# Patient Record
Sex: MALE | Race: WHITE | Employment: OTHER | URBAN - METROPOLITAN AREA
[De-identification: names, ages, dates, MRNs, and addresses within clinical notes are randomized per-mention and may not be internally consistent; named-entity substitution may affect disease eponyms.]

---

## 2017-10-05 PROBLEM — E78.00 PURE HYPERCHOLESTEROLEMIA: Status: ACTIVE | Noted: 2017-10-05

## 2017-10-05 PROCEDURE — 81003 URINALYSIS AUTO W/O SCOPE: CPT | Performed by: FAMILY MEDICINE

## 2018-07-26 PROCEDURE — 81003 URINALYSIS AUTO W/O SCOPE: CPT | Performed by: FAMILY MEDICINE

## 2019-03-21 ENCOUNTER — HOSPITAL ENCOUNTER (INPATIENT)
Facility: HOSPITAL | Age: 84
LOS: 1 days | Discharge: HOME | DRG: 446 | End: 2019-03-22
Attending: EMERGENCY MEDICINE | Admitting: HOSPITALIST
Payer: MEDICARE

## 2019-03-21 ENCOUNTER — APPOINTMENT (OUTPATIENT)
Dept: CT IMAGING | Facility: HOSPITAL | Age: 84
DRG: 446 | End: 2019-03-21
Attending: EMERGENCY MEDICINE
Payer: MEDICARE

## 2019-03-21 DIAGNOSIS — K83.8 DILATED CBD, ACQUIRED: ICD-10-CM

## 2019-03-21 DIAGNOSIS — R17 JAUNDICE: Primary | ICD-10-CM

## 2019-03-21 DIAGNOSIS — R79.89 ELEVATED LFTS: ICD-10-CM

## 2019-03-21 LAB
ALBUMIN SERPL-MCNC: 3.5 G/DL (ref 3.4–5)
ALBUMIN/GLOB SERPL: 0.9 {RATIO} (ref 1–2)
ALP LIVER SERPL-CCNC: 167 U/L (ref 45–117)
ALT SERPL-CCNC: 363 U/L (ref 16–61)
AMMONIA PLAS-MCNC: 28 UMOL/L (ref 11–32)
ANION GAP SERPL CALC-SCNC: 9 MMOL/L (ref 0–18)
AST SERPL-CCNC: 186 U/L (ref 15–37)
BASOPHILS # BLD AUTO: 0.03 X10(3) UL (ref 0–0.2)
BASOPHILS NFR BLD AUTO: 0.4 %
BILIRUB SERPL-MCNC: 8.5 MG/DL (ref 0.1–2)
BILIRUB UR QL STRIP.AUTO: NEGATIVE
BUN BLD-MCNC: 8 MG/DL (ref 7–18)
BUN/CREAT SERPL: 8.6 (ref 10–20)
C DIFF TOX B STL QL: NEGATIVE
CALCIUM BLD-MCNC: 9.3 MG/DL (ref 8.5–10.1)
CANCER AG19-9 SERPL-ACNC: 261.6 U/ML (ref ?–37)
CHLORIDE SERPL-SCNC: 105 MMOL/L (ref 98–107)
CLARITY UR REFRACT.AUTO: CLEAR
CO2 SERPL-SCNC: 21 MMOL/L (ref 21–32)
COLOR UR AUTO: YELLOW
CREAT BLD-MCNC: 0.93 MG/DL (ref 0.7–1.3)
DEPRECATED RDW RBC AUTO: 43 FL (ref 35.1–46.3)
EOSINOPHIL # BLD AUTO: 0.08 X10(3) UL (ref 0–0.7)
EOSINOPHIL NFR BLD AUTO: 1.2 %
ERYTHROCYTE [DISTWIDTH] IN BLOOD BY AUTOMATED COUNT: 13.3 % (ref 11–15)
GLOBULIN PLAS-MCNC: 4 G/DL (ref 2.8–4.4)
GLUCOSE BLD-MCNC: 130 MG/DL (ref 70–99)
GLUCOSE UR STRIP.AUTO-MCNC: NEGATIVE MG/DL
HAV IGM SER QL: NONREACTIVE
HBV CORE IGM SER QL: NONREACTIVE
HBV SURFACE AG SERPL QL IA: NONREACTIVE
HCT VFR BLD AUTO: 44.1 % (ref 39–53)
HCV AB SERPL QL IA: NONREACTIVE
HGB BLD-MCNC: 16.1 G/DL (ref 13–17.5)
IMM GRANULOCYTES # BLD AUTO: 0.03 X10(3) UL (ref 0–1)
IMM GRANULOCYTES NFR BLD: 0.4 %
INR BLD: 0.96 (ref 0.9–1.1)
KETONES UR STRIP.AUTO-MCNC: NEGATIVE MG/DL
LEUKOCYTE ESTERASE UR QL STRIP.AUTO: NEGATIVE
LIPASE SERPL-CCNC: 116 U/L (ref 73–393)
LYMPHOCYTES # BLD AUTO: 0.67 X10(3) UL (ref 1–4)
LYMPHOCYTES NFR BLD AUTO: 9.8 %
M PROTEIN MFR SERPL ELPH: 7.5 G/DL (ref 6.4–8.2)
MCH RBC QN AUTO: 32.5 PG (ref 26–34)
MCHC RBC AUTO-ENTMCNC: 36.5 G/DL (ref 31–37)
MCV RBC AUTO: 88.9 FL (ref 80–100)
MONOCYTES # BLD AUTO: 0.55 X10(3) UL (ref 0.1–1)
MONOCYTES NFR BLD AUTO: 8.1 %
NEUTROPHILS # BLD AUTO: 5.47 X10 (3) UL (ref 1.5–7.7)
NEUTROPHILS # BLD AUTO: 5.47 X10(3) UL (ref 1.5–7.7)
NEUTROPHILS NFR BLD AUTO: 80.1 %
NITRITE UR QL STRIP.AUTO: NEGATIVE
OSMOLALITY SERPL CALC.SUM OF ELEC: 280 MOSM/KG (ref 275–295)
PH UR STRIP.AUTO: 7 [PH] (ref 4.5–8)
PLATELET # BLD AUTO: 194 10(3)UL (ref 150–450)
POTASSIUM SERPL-SCNC: 3.7 MMOL/L (ref 3.5–5.1)
PROT UR STRIP.AUTO-MCNC: NEGATIVE MG/DL
PSA SERPL DL<=0.01 NG/ML-MCNC: 13.2 SECONDS (ref 12.5–14.7)
RBC # BLD AUTO: 4.96 X10(6)UL (ref 3.8–5.8)
RBC UR QL AUTO: NEGATIVE
SODIUM SERPL-SCNC: 135 MMOL/L (ref 136–145)
SP GR UR STRIP.AUTO: <1.005 (ref 1–1.03)
UROBILINOGEN UR STRIP.AUTO-MCNC: <2 MG/DL
WBC # BLD AUTO: 6.8 X10(3) UL (ref 4–11)

## 2019-03-21 PROCEDURE — 87045 FECES CULTURE AEROBIC BACT: CPT | Performed by: NURSE PRACTITIONER

## 2019-03-21 PROCEDURE — 74177 CT ABD & PELVIS W/CONTRAST: CPT | Performed by: EMERGENCY MEDICINE

## 2019-03-21 PROCEDURE — 85610 PROTHROMBIN TIME: CPT | Performed by: NURSE PRACTITIONER

## 2019-03-21 PROCEDURE — 85025 COMPLETE CBC W/AUTO DIFF WBC: CPT | Performed by: EMERGENCY MEDICINE

## 2019-03-21 PROCEDURE — 99285 EMERGENCY DEPT VISIT HI MDM: CPT | Performed by: EMERGENCY MEDICINE

## 2019-03-21 PROCEDURE — 86301 IMMUNOASSAY TUMOR CA 19-9: CPT | Performed by: NURSE PRACTITIONER

## 2019-03-21 PROCEDURE — 87427 SHIGA-LIKE TOXIN AG IA: CPT | Performed by: NURSE PRACTITIONER

## 2019-03-21 PROCEDURE — 87493 C DIFF AMPLIFIED PROBE: CPT | Performed by: NURSE PRACTITIONER

## 2019-03-21 PROCEDURE — 82140 ASSAY OF AMMONIA: CPT | Performed by: EMERGENCY MEDICINE

## 2019-03-21 PROCEDURE — 81003 URINALYSIS AUTO W/O SCOPE: CPT | Performed by: EMERGENCY MEDICINE

## 2019-03-21 PROCEDURE — 96360 HYDRATION IV INFUSION INIT: CPT | Performed by: EMERGENCY MEDICINE

## 2019-03-21 PROCEDURE — 96361 HYDRATE IV INFUSION ADD-ON: CPT | Performed by: EMERGENCY MEDICINE

## 2019-03-21 PROCEDURE — 80074 ACUTE HEPATITIS PANEL: CPT | Performed by: EMERGENCY MEDICINE

## 2019-03-21 PROCEDURE — 80053 COMPREHEN METABOLIC PANEL: CPT | Performed by: EMERGENCY MEDICINE

## 2019-03-21 PROCEDURE — 83690 ASSAY OF LIPASE: CPT | Performed by: EMERGENCY MEDICINE

## 2019-03-21 PROCEDURE — 87046 STOOL CULTR AEROBIC BACT EA: CPT | Performed by: NURSE PRACTITIONER

## 2019-03-21 RX ORDER — ACETAMINOPHEN 325 MG/1
650 TABLET ORAL EVERY 6 HOURS PRN
Status: DISCONTINUED | OUTPATIENT
Start: 2019-03-21 | End: 2019-03-22

## 2019-03-21 RX ORDER — ASPIRIN 81 MG/1
81 TABLET ORAL DAILY
Status: ON HOLD | COMMUNITY
End: 2019-03-22

## 2019-03-21 RX ORDER — CALCIUM POLYCARBOPHIL 625 MG
2 TABLET ORAL DAILY
COMMUNITY

## 2019-03-21 RX ORDER — METOCLOPRAMIDE HYDROCHLORIDE 5 MG/ML
10 INJECTION INTRAMUSCULAR; INTRAVENOUS EVERY 8 HOURS PRN
Status: DISCONTINUED | OUTPATIENT
Start: 2019-03-21 | End: 2019-03-22

## 2019-03-21 RX ORDER — HEPARIN SODIUM 5000 [USP'U]/ML
5000 INJECTION, SOLUTION INTRAVENOUS; SUBCUTANEOUS EVERY 8 HOURS SCHEDULED
Status: DISCONTINUED | OUTPATIENT
Start: 2019-03-21 | End: 2019-03-22

## 2019-03-21 RX ORDER — ONDANSETRON 2 MG/ML
4 INJECTION INTRAMUSCULAR; INTRAVENOUS EVERY 6 HOURS PRN
Status: DISCONTINUED | OUTPATIENT
Start: 2019-03-21 | End: 2019-03-22

## 2019-03-21 RX ORDER — OMEPRAZOLE 20 MG/1
20 CAPSULE, DELAYED RELEASE ORAL
COMMUNITY
End: 2020-01-13

## 2019-03-21 RX ORDER — POTASSIUM CHLORIDE 20 MEQ/1
40 TABLET, EXTENDED RELEASE ORAL ONCE
Status: COMPLETED | OUTPATIENT
Start: 2019-03-21 | End: 2019-03-21

## 2019-03-21 NOTE — PROGRESS NOTES
Pt arrived from ER, he is alert and oriented, very pleasant . He stated he had a few loose watery stools this am. He is in contact, r/o c-diff, RA belly soft, non-distended. Hyperactive bs. NPO after midnight, for ERCP in AM, consent signed, denies pain.  Phong Thomas

## 2019-03-21 NOTE — H&P
[facility] Hospitalist History and Physical      Patient presents with:  Jaundice (gastrointestinal, hematologic)  Nausea/Vomiting/Diarrhea (gastrointestinal)       PCP: Shanae Aaron MD      History of Present Illness: Patient is a 80year old male with PMH si appreciated   Abdomen:   Soft, non-tender. Bowel sounds normal. No masses,  No organomegaly. Non distended   Extremities: Extremities normal, atraumatic, no cyanosis or edema. Skin: Skin color, texture, turgor normal. No rashes or lesions.     Neurologic: evaluation as clinically directed. There is also moderate porcelain calcification of the gallbladder wall with cholelithiasis also noted. SPLEEN:  Calcified granulomas. Accessory spleen noted. PANCREAS:  Unremarkable. ADRENALS:  Unremarkable.  KIDNEYS:  S Approved by: Alayna Merritt MD               Assessment/Plan:     Dilated intrahepatic bile ducts, porcelain calcifications GB w cholelithiasis  - plan EUS w ERCP tomorrow  - ca 19-9    Diarrhea  - r/o cdif          Outpatient records or previous hospital

## 2019-03-21 NOTE — CONSULTS
[facility]                       Gastroenterology [address] Gastroenterology    Hunter Simpers Patient Status:  Emergency    4/3/1934 MRN [id number]   Location [address] [facility] Attending Philip García MD   [facility] Day # history of colon cancer or other gastrointestinal malignancies; No family history of ulcer disease, or inflammatory bowel disease  ROS:  In addition to the pertinent positives described above:             Infectious Disease:  No chronic infections or recen to percussion in the upper abd  Ext: No peripheral edema or cyanosis  Skin: Warm and dry; generalized jaundice   Psychiatric: Appropriate mood and congruent affect without obvious depression or anxiety  Labs: Lab Results   Component Value Date    WBC 6.8 0 entirely excluded. Clinical correlation   recommended. MRCP/ERCP could be performed for further evaluation as clinically directed. There is also moderate porcelain calcification of the gallbladder wall with cholelithiasis also noted.   SPLEEN:  Calcified enlargement. 6. Left renal cysts. Please see above for further details.        Dictated by: Ashley Reza MD on 3/21/2019 at 12:07       Approved by: Ashley Reza MD     Impression: 80 yr-old male with hx of dyslipidemia and GERD presented to t Gastroenterology  [phone]

## 2019-03-21 NOTE — ED PROVIDER NOTES
Patient Seen in: [facility] Emergency Department    History   Patient presents with:  Jaundice (gastrointestinal, hematologic)  Nausea/Vomiting/Diarrhea (gastrointestinal)    Stated Complaint: n/v/d, jaundice    HPI    80-year-old male presents demetra scleral icterus, mucous membranes are moist, there is no erythema or exudate in the posterior pharynx  Neck: Supple no JVD no lymphadenopathy no meningismus no carotid bruit  CV: Regular rate and rhythm no murmur rub  Respiratory: Clear to auscultation ricki work.    Ct Abdomen+pelvis(contrast Only)(cpt=74177)    Result Date: 3/21/2019  CONCLUSION:   1. There is mild dilatation of the intrahepatic biliary ducts. There is also dilatation of the common bile duct measuring up to 1.2 cm in diameter.   A distal obs

## 2019-03-22 ENCOUNTER — ANESTHESIA EVENT (OUTPATIENT)
Dept: ENDOSCOPY | Facility: HOSPITAL | Age: 84
End: 2019-03-22

## 2019-03-22 ENCOUNTER — ANESTHESIA (OUTPATIENT)
Dept: ENDOSCOPY | Facility: HOSPITAL | Age: 84
End: 2019-03-22

## 2019-03-22 VITALS
WEIGHT: 205 LBS | DIASTOLIC BLOOD PRESSURE: 71 MMHG | TEMPERATURE: 98 F | HEART RATE: 66 BPM | BODY MASS INDEX: 27.17 KG/M2 | HEIGHT: 73 IN | SYSTOLIC BLOOD PRESSURE: 132 MMHG | OXYGEN SATURATION: 95 % | RESPIRATION RATE: 18 BRPM

## 2019-03-22 LAB
ALBUMIN SERPL-MCNC: 3.3 G/DL (ref 3.4–5)
ALBUMIN/GLOB SERPL: 1 {RATIO} (ref 1–2)
ALP LIVER SERPL-CCNC: 160 U/L (ref 45–117)
ALT SERPL-CCNC: 302 U/L (ref 16–61)
ANION GAP SERPL CALC-SCNC: 9 MMOL/L (ref 0–18)
AST SERPL-CCNC: 120 U/L (ref 15–37)
BASOPHILS # BLD AUTO: 0.02 X10(3) UL (ref 0–0.2)
BASOPHILS NFR BLD AUTO: 0.4 %
BILIRUB SERPL-MCNC: 2.8 MG/DL (ref 0.1–2)
BUN BLD-MCNC: 8 MG/DL (ref 7–18)
BUN/CREAT SERPL: 9.5 (ref 10–20)
CALCIUM BLD-MCNC: 8.6 MG/DL (ref 8.5–10.1)
CHLORIDE SERPL-SCNC: 109 MMOL/L (ref 98–107)
CO2 SERPL-SCNC: 23 MMOL/L (ref 21–32)
CREAT BLD-MCNC: 0.84 MG/DL (ref 0.7–1.3)
DEPRECATED RDW RBC AUTO: 44.3 FL (ref 35.1–46.3)
EOSINOPHIL # BLD AUTO: 0.17 X10(3) UL (ref 0–0.7)
EOSINOPHIL NFR BLD AUTO: 3.7 %
ERYTHROCYTE [DISTWIDTH] IN BLOOD BY AUTOMATED COUNT: 13.2 % (ref 11–15)
GLOBULIN PLAS-MCNC: 3.2 G/DL (ref 2.8–4.4)
GLUCOSE BLD-MCNC: 98 MG/DL (ref 70–99)
HAV IGM SER QL: 2 MG/DL (ref 1.6–2.6)
HCT VFR BLD AUTO: 41.1 % (ref 39–53)
HGB BLD-MCNC: 14.6 G/DL (ref 13–17.5)
IMM GRANULOCYTES # BLD AUTO: 0.01 X10(3) UL (ref 0–1)
IMM GRANULOCYTES NFR BLD: 0.2 %
LYMPHOCYTES # BLD AUTO: 0.99 X10(3) UL (ref 1–4)
LYMPHOCYTES NFR BLD AUTO: 21.6 %
M PROTEIN MFR SERPL ELPH: 6.5 G/DL (ref 6.4–8.2)
MCH RBC QN AUTO: 32.2 PG (ref 26–34)
MCHC RBC AUTO-ENTMCNC: 35.5 G/DL (ref 31–37)
MCV RBC AUTO: 90.7 FL (ref 80–100)
MONOCYTES # BLD AUTO: 0.47 X10(3) UL (ref 0.1–1)
MONOCYTES NFR BLD AUTO: 10.3 %
NEUTROPHILS # BLD AUTO: 2.92 X10 (3) UL (ref 1.5–7.7)
NEUTROPHILS # BLD AUTO: 2.92 X10(3) UL (ref 1.5–7.7)
NEUTROPHILS NFR BLD AUTO: 63.8 %
OSMOLALITY SERPL CALC.SUM OF ELEC: 290 MOSM/KG (ref 275–295)
PLATELET # BLD AUTO: 179 10(3)UL (ref 150–450)
POTASSIUM SERPL-SCNC: 3.9 MMOL/L (ref 3.5–5.1)
POTASSIUM SERPL-SCNC: 3.9 MMOL/L (ref 3.5–5.1)
RBC # BLD AUTO: 4.53 X10(6)UL (ref 3.8–5.8)
SODIUM SERPL-SCNC: 141 MMOL/L (ref 136–145)
WBC # BLD AUTO: 4.6 X10(3) UL (ref 4–11)

## 2019-03-22 PROCEDURE — 84132 ASSAY OF SERUM POTASSIUM: CPT | Performed by: INTERNAL MEDICINE

## 2019-03-22 PROCEDURE — 83735 ASSAY OF MAGNESIUM: CPT | Performed by: NURSE PRACTITIONER

## 2019-03-22 PROCEDURE — 85025 COMPLETE CBC W/AUTO DIFF WBC: CPT | Performed by: NURSE PRACTITIONER

## 2019-03-22 PROCEDURE — 0FJB8ZZ INSPECTION OF HEPATOBILIARY DUCT, VIA NATURAL OR ARTIFICIAL OPENING ENDOSCOPIC: ICD-10-PCS | Performed by: INTERNAL MEDICINE

## 2019-03-22 PROCEDURE — BF40ZZZ ULTRASONOGRAPHY OF BILE DUCTS: ICD-10-PCS | Performed by: INTERNAL MEDICINE

## 2019-03-22 PROCEDURE — 80053 COMPREHEN METABOLIC PANEL: CPT | Performed by: NURSE PRACTITIONER

## 2019-03-22 NOTE — OPERATIVE REPORT
Mariangel Gomes Patient Status:  Observation    4/3/1934 MRN [id number]   [facility] ENDOSCOPY Attending Ada De La Fuente MD   Hosp Day # 0 PCP Terry Moser MD     PREOPERATIVE DIAGNOSIS/INDICATION: Obstructive Jaundice  POST IMPRESSION:   1. Normal EUS of the CBD indicated likely passed stone. No ERCP required. 2. Cholelithiasis. RECOMMENDATIONS:    1. Consider cholecystectomy per general surgery.      Tracie Malagon  Gastroenterology/Advanced [address]

## 2019-03-22 NOTE — ANESTHESIA PREPROCEDURE EVALUATION
PRE-OP EVALUATION    Patient Name: Jovita López    Pre-op Diagnosis: Elevated LFTs [R94.5]  Dilated cbd, acquired [K83.8]    Procedure(s):  ENDOSCOPIC ULTRASOUND (EUS), ENDOSCOPIC RETROGRADE CHOLANGIOPANCREATOGRAPHY (ERCP)  ENDOSCOPIC RETROGRADE Lyric Langford Endo/Other                           (+) arthritis       Pulmonary                           Neuro/Psych                                    GERD (gastroesophageal reflux disease) Primary osteoarthritis of right knee   Pure hypercholesterolemia Jaundice   E

## 2019-03-22 NOTE — CM/SW NOTE
03/22/19 1300   CM/SW Screening   Referral Source    Information Source Chart review;Nursing rounds   Patient's Mental Status Alert;Oriented   Patient's [address]   Patient lives with Spouse   Patient Status Prior to Admission

## 2019-03-22 NOTE — PROGRESS NOTES
NURSING DISCHARGE NOTE    Discharged Home via Ambulatory. Accompanied by Family member  Belongings Taken by patient/family. Discharge paperwork given to patient.  Instructed patient to hold aspirin for procedure on Wednesday, he voiced understanding

## 2019-03-22 NOTE — DISCHARGE SUMMARY
General Medicine Discharge Summary     Patient ID:  Chintan Manzo  80year old  4/3/1934    Admit date: 3/21/2019    Discharge date and time: 3/22/2019    Attending Physician: Destinee Kim MD taking these medications    aspirin 81 MG Oral Tab EC          Activity: activity as tolerated  Diet: regular diet  Wound Care: as directed  Code Status: Full Code      Exam on day of discharge:      03/22/19  1220   BP:    Pulse: 66   Resp:    Temp:

## 2019-03-22 NOTE — ANESTHESIA POSTPROCEDURE EVALUATION
[facility]    Hunter Giles Patient Status:  Observation   Age/Gender 80year old male MRN [id number]   Location [address] [facility]. Attending Kendra Vega MD   Hosp Day # 0 PCP Sagar Lee MD       Anesthesia Post-op Note    Proc

## 2019-03-22 NOTE — PLAN OF CARE
Patient/Family Goals    • Patient/Family Long Term Goal Progressing    • Patient/Family Short Term Goal Progressing            Patient alert and orientated. Oxygen WNL on room air. Pt with no complaints of pain. Medications given per MAR.  NPO since midnigh

## 2019-03-25 ENCOUNTER — TELEPHONE (OUTPATIENT)
Dept: SURGERY | Facility: CLINIC | Age: 84
End: 2019-03-25

## 2019-03-25 ENCOUNTER — OFFICE VISIT (OUTPATIENT)
Dept: SURGERY | Facility: CLINIC | Age: 84
End: 2019-03-25
Payer: COMMERCIAL

## 2019-03-25 VITALS
WEIGHT: 205 LBS | HEART RATE: 60 BPM | BODY MASS INDEX: 27.77 KG/M2 | TEMPERATURE: 98 F | HEIGHT: 72 IN | SYSTOLIC BLOOD PRESSURE: 150 MMHG | DIASTOLIC BLOOD PRESSURE: 79 MMHG

## 2019-03-25 DIAGNOSIS — K80.65 CALCULUS OF GALLBLADDER AND BILE DUCT WITH CHRONIC CHOLECYSTITIS WITH OBSTRUCTION: Primary | ICD-10-CM

## 2019-03-25 DIAGNOSIS — R79.89 ELEVATED LFTS: ICD-10-CM

## 2019-03-25 DIAGNOSIS — R17 JAUNDICE: ICD-10-CM

## 2019-03-25 DIAGNOSIS — K82.8 PORCELAIN GALLBLADDER: ICD-10-CM

## 2019-03-25 PROCEDURE — 99204 OFFICE O/P NEW MOD 45 MIN: CPT | Performed by: COLON & RECTAL SURGERY

## 2019-03-25 NOTE — PATIENT INSTRUCTIONS
Amanda Ovalle is a 80year old male here for consultation regarding a porcelain gallbladder, jaundice, and cholelithiasis. This patient's most current episode started March 18, 2019. He initially had persistent diarrhea.  He then started to develop j and normal pitch. No guarding or rebound. Liver is within normal limits and spleen is not palpable. There are no masses within the abdominal wall or the abdomen. No palpable ventral or inguinal hernias present.     This patient requires a semi-urgent lapar

## 2019-03-25 NOTE — H&P
New Patient Visit Note       Active Problems      1. Calculus of gallbladder and bile duct with chronic cholecystitis with obstruction    2. Porcelain gallbladder    3. Jaundice    4.  Elevated LFTs        Chief Complaint   Patient presents with:  Bonnie Valdes a stroke or TIA. The patient does not have a heart murmur or valve. The patient does not require special antibiotics prior to surgery because of a prosthesis or any other reason. The patient is not on medications that might interfere with anesthesia. granulomas. Accessory spleen noted. PANCREAS:  Unremarkable. ADRENALS:  Unremarkable. KIDNEYS:  Subcentimeter hypodensities in the kidneys are too small to further characterize. Left renal cysts with the largest measuring up to 4.8 cm.    AORTA/VASCULA Surgical / Social / Family History    The past medical and past surgical history have been reviewed by me today.     Past Medical History:   Diagnosis Date   • Esophageal reflux    • Other and unspecified hyperlipidemia    • Visual impairment     READERS swallowing. Respiratory: Negative for apnea, cough, shortness of breath and wheezing. Cardiovascular: Negative for chest pain, palpitations and leg swelling.    Gastrointestinal: Negative for abdominal distention, abdominal pain, anal bleeding, blood right inguinal area and confirmed negative in the left inguinal area. Abdominal exam: Abdomen is soft, non-tender, non-distended. Bowel sounds are present with normal activity and normal pitch. No guarding or rebound.  Liver is within normal limits an patient has not had previous abdominal surgery. The patient has had a CT scan of the abdomen. I have personally reviewed the CT scan myself and provided my own interpretation.  The CT scan is dated March 21, 2019, and reveals a porcelain gallbladder and papillotomy was performed, if he drops another stone in the common bile duct, it could re-create his symptoms and even give him pancreatitis.     This patient will be taken to the operating room on March 27, 2019, for laparoscopic cholecystectomy with Usman Prather

## 2019-03-26 ENCOUNTER — ANESTHESIA EVENT (OUTPATIENT)
Dept: SURGERY | Facility: HOSPITAL | Age: 84
End: 2019-03-26

## 2019-03-26 PROBLEM — K82.8 PORCELAIN GALLBLADDER: Status: ACTIVE | Noted: 2019-03-26

## 2019-03-26 PROBLEM — K80.65 CALCULUS OF GALLBLADDER AND BILE DUCT WITH CHRONIC CHOLECYSTITIS WITH OBSTRUCTION: Status: ACTIVE | Noted: 2019-03-26

## 2019-03-27 ENCOUNTER — HOSPITAL ENCOUNTER (OUTPATIENT)
Facility: HOSPITAL | Age: 84
Setting detail: HOSPITAL OUTPATIENT SURGERY
Discharge: HOME | End: 2019-03-27
Attending: COLON & RECTAL SURGERY | Admitting: COLON & RECTAL SURGERY
Payer: MEDICARE

## 2019-03-27 ENCOUNTER — APPOINTMENT (OUTPATIENT)
Dept: GENERAL RADIOLOGY | Facility: HOSPITAL | Age: 84
End: 2019-03-27
Attending: COLON & RECTAL SURGERY
Payer: MEDICARE

## 2019-03-27 ENCOUNTER — ANESTHESIA (OUTPATIENT)
Dept: SURGERY | Facility: HOSPITAL | Age: 84
End: 2019-03-27

## 2019-03-27 VITALS
DIASTOLIC BLOOD PRESSURE: 81 MMHG | SYSTOLIC BLOOD PRESSURE: 141 MMHG | TEMPERATURE: 98 F | BODY MASS INDEX: 28.17 KG/M2 | HEIGHT: 72 IN | RESPIRATION RATE: 18 BRPM | WEIGHT: 208 LBS | HEART RATE: 65 BPM | OXYGEN SATURATION: 95 %

## 2019-03-27 DIAGNOSIS — K80.65 CALCULUS OF GALLBLADDER AND BILE DUCT WITH CHRONIC CHOLECYSTITIS WITH OBSTRUCTION: ICD-10-CM

## 2019-03-27 DIAGNOSIS — K81.9 CHOLECYSTITIS: ICD-10-CM

## 2019-03-27 PROCEDURE — 0FT44ZZ RESECTION OF GALLBLADDER, PERCUTANEOUS ENDOSCOPIC APPROACH: ICD-10-PCS | Performed by: COLON & RECTAL SURGERY

## 2019-03-27 PROCEDURE — BF031ZZ PLAIN RADIOGRAPHY OF GALLBLADDER AND BILE DUCTS USING LOW OSMOLAR CONTRAST: ICD-10-PCS | Performed by: COLON & RECTAL SURGERY

## 2019-03-27 PROCEDURE — 74300 X-RAY BILE DUCTS/PANCREAS: CPT | Performed by: COLON & RECTAL SURGERY

## 2019-03-27 RX ORDER — ONDANSETRON 2 MG/ML
4 INJECTION INTRAMUSCULAR; INTRAVENOUS AS NEEDED
Status: DISCONTINUED | OUTPATIENT
Start: 2019-03-27 | End: 2019-03-27

## 2019-03-27 RX ORDER — BUPIVACAINE HYDROCHLORIDE AND EPINEPHRINE 5; 5 MG/ML; UG/ML
INJECTION, SOLUTION EPIDURAL; INTRACAUDAL; PERINEURAL AS NEEDED
Status: DISCONTINUED | OUTPATIENT
Start: 2019-03-27 | End: 2019-03-27 | Stop reason: HOSPADM

## 2019-03-27 RX ORDER — NALOXONE HYDROCHLORIDE 0.4 MG/ML
80 INJECTION, SOLUTION INTRAMUSCULAR; INTRAVENOUS; SUBCUTANEOUS AS NEEDED
Status: DISCONTINUED | OUTPATIENT
Start: 2019-03-27 | End: 2019-03-27

## 2019-03-27 RX ORDER — SODIUM CHLORIDE, SODIUM LACTATE, POTASSIUM CHLORIDE, CALCIUM CHLORIDE 600; 310; 30; 20 MG/100ML; MG/100ML; MG/100ML; MG/100ML
INJECTION, SOLUTION INTRAVENOUS CONTINUOUS
Status: DISCONTINUED | OUTPATIENT
Start: 2019-03-27 | End: 2019-03-27

## 2019-03-27 RX ORDER — HEPARIN SODIUM 5000 [USP'U]/ML
5000 INJECTION, SOLUTION INTRAVENOUS; SUBCUTANEOUS ONCE
Status: COMPLETED | OUTPATIENT
Start: 2019-03-27 | End: 2019-03-27

## 2019-03-27 RX ORDER — HYDROCODONE BITARTRATE AND ACETAMINOPHEN 5; 325 MG/1; MG/1
2 TABLET ORAL AS NEEDED
Status: DISCONTINUED | OUTPATIENT
Start: 2019-03-27 | End: 2019-03-27

## 2019-03-27 RX ORDER — HYDROCODONE BITARTRATE AND ACETAMINOPHEN 5; 325 MG/1; MG/1
1 TABLET ORAL AS NEEDED
Status: DISCONTINUED | OUTPATIENT
Start: 2019-03-27 | End: 2019-03-27

## 2019-03-27 RX ORDER — SCOLOPAMINE TRANSDERMAL SYSTEM 1 MG/1
PATCH, EXTENDED RELEASE TRANSDERMAL
Status: DISCONTINUED | OUTPATIENT
Start: 2019-03-27 | End: 2019-03-27 | Stop reason: HOSPADM

## 2019-03-27 RX ORDER — DEXAMETHASONE SODIUM PHOSPHATE 4 MG/ML
4 VIAL (ML) INJECTION AS NEEDED
Status: DISCONTINUED | OUTPATIENT
Start: 2019-03-27 | End: 2019-03-27

## 2019-03-27 RX ORDER — ACETAMINOPHEN 500 MG
1000 TABLET ORAL ONCE
Status: DISCONTINUED | OUTPATIENT
Start: 2019-03-27 | End: 2019-03-27 | Stop reason: HOSPADM

## 2019-03-27 RX ORDER — HYDROCODONE BITARTRATE AND ACETAMINOPHEN 5; 325 MG/1; MG/1
1 TABLET ORAL EVERY 6 HOURS PRN
Qty: 20 TABLET | Refills: 0 | Status: SHIPPED | OUTPATIENT
Start: 2019-03-27 | End: 2020-09-17 | Stop reason: ALTCHOICE

## 2019-03-27 RX ORDER — HYDROMORPHONE HYDROCHLORIDE 1 MG/ML
0.4 INJECTION, SOLUTION INTRAMUSCULAR; INTRAVENOUS; SUBCUTANEOUS EVERY 5 MIN PRN
Status: DISCONTINUED | OUTPATIENT
Start: 2019-03-27 | End: 2019-03-27

## 2019-03-27 RX ORDER — DIPHENHYDRAMINE HYDROCHLORIDE 50 MG/ML
12.5 INJECTION INTRAMUSCULAR; INTRAVENOUS AS NEEDED
Status: DISCONTINUED | OUTPATIENT
Start: 2019-03-27 | End: 2019-03-27

## 2019-03-27 RX ORDER — LABETALOL HYDROCHLORIDE 5 MG/ML
5 INJECTION, SOLUTION INTRAVENOUS EVERY 5 MIN PRN
Status: DISCONTINUED | OUTPATIENT
Start: 2019-03-27 | End: 2019-03-27

## 2019-03-27 RX ORDER — MIDAZOLAM HYDROCHLORIDE 1 MG/ML
1 INJECTION INTRAMUSCULAR; INTRAVENOUS EVERY 5 MIN PRN
Status: DISCONTINUED | OUTPATIENT
Start: 2019-03-27 | End: 2019-03-27

## 2019-03-27 NOTE — ANESTHESIA POSTPROCEDURE EVALUATION
Clyde [address] Patient Status:  Hospital Outpatient Surgery   Age/Gender 80year old male MRN [id number]   Location [address] [facility] Attending Bharat Lepe MD   Hosp Day # 0 PCP MD Gabriela Worthington

## 2019-03-27 NOTE — H&P
Active Problems      1. Calculus of gallbladder and bile duct with chronic cholecystitis with obstruction    2. Porcelain gallbladder    3. Jaundice    4.  Elevated LFTs          Chief Complaint   Patient presents with:  Gallbladder: New pt, referred by  The patient does not have a heart murmur or valve. The patient does not require special antibiotics prior to surgery because of a prosthesis or any other reason. The patient is not on medications that might interfere with anesthesia.   He does note oscar related to underdistention, with nonspecific colitis not excluded. Clinical correlation recommended. Unremarkable appendix. Scattered feces in the colon. No large or small   bowel dilatation. No free air or fluid.   ABDOMINAL WALL:  Small fat containin SHOULDER   • CATARACT Bilateral     • OTHER SURGICAL HISTORY   2006     Parathyroidectomy   • OTHER SURGICAL HISTORY   1990     Repair R torn biceps tendon   • PARATHYROIDECTOMY       • UPPER GI ENDOSCOPY,EXAM             The family history and social hist heard.  Pulmonary/Chest: Effort normal and breath sounds normal. No stridor. No respiratory distress. He has no wheezes. He has no rales. He exhibits no tenderness.    Abdominal: He exhibits no distension, no fluid wave, no ascites, no pulsatile midline mas Cristal Kim is a 80year old male here for consultation regarding a porcelain gallbladder, jaundice, and cholelithiasis.       This patient will be taken to the operating room for laparoscopic cholecystectomy with cholangiogram.     All risks, benefits, compli

## 2019-03-27 NOTE — OPERATIVE REPORT
[facility]   Operative Note    Will Dunn Location: OR   [facility] [id number] MRN [id number]   Admission Date 3/27/2019 Operation Date 3/27/2019   Attending Physician Rory Heredia MD Operating Physician Misael Lu MD     Pre-Operative Diagnosis: Porc limits. There are no systemic adhesions. Findings regarding the gallbladder included adhesions to the neck of the gallbladder. The remaining diagnostic laparoscopy reveals no other abnormalities.  The stomach, duodenum, anterior surfaces of the intestine complication. The catheter was removed. The cystic duct was doubly clipped distally and divided between the distal and proximal clips. Additional ligation of the cystic duct was performed with an Endoloop.   The cystic artery and all of its branches

## 2019-03-27 NOTE — ANESTHESIA PREPROCEDURE EVALUATION
PRE-OP EVALUATION    Patient Name: Madeline Allen    Pre-op Diagnosis: Cholecystitis [K81.9]    Procedure(s):  LAPAROSCOPIC CHOLECYSTECTOMY WITH CHOLANGIOGRAM     Surgeon(s) and Role:     Elvira Argueta MD - Primary    Pre-op vitals reviewed. Temp: 98. 4 MENISCUS Right     SHOULDER   • CATARACT Bilateral    • OTHER SURGICAL HISTORY  2006    Parathyroidectomy   • OTHER SURGICAL HISTORY  1990    Repair R torn biceps tendon   • PARATHYROIDECTOMY     • UPPER GI ENDOSCOPY,EXAM       Social History    [employer]

## 2019-04-04 ENCOUNTER — OFFICE VISIT (OUTPATIENT)
Dept: SURGERY | Facility: CLINIC | Age: 84
End: 2019-04-04

## 2019-04-04 VITALS
HEART RATE: 66 BPM | HEIGHT: 72 IN | SYSTOLIC BLOOD PRESSURE: 135 MMHG | TEMPERATURE: 98 F | BODY MASS INDEX: 27.77 KG/M2 | WEIGHT: 205 LBS | DIASTOLIC BLOOD PRESSURE: 78 MMHG

## 2019-04-04 DIAGNOSIS — K82.8 PORCELAIN GALLBLADDER: Primary | ICD-10-CM

## 2019-04-04 DIAGNOSIS — K80.65 CALCULUS OF GALLBLADDER AND BILE DUCT WITH CHRONIC CHOLECYSTITIS WITH OBSTRUCTION: ICD-10-CM

## 2019-04-04 PROCEDURE — 99024 POSTOP FOLLOW-UP VISIT: CPT | Performed by: PHYSICIAN ASSISTANT

## 2019-04-04 NOTE — PROGRESS NOTES
Post Operative Visit Note       Active Problems  1. Porcelain gallbladder    2.  Calculus of gallbladder and bile duct with chronic cholecystitis with obstruction         Chief Complaint   Patient presents with:  Gallbladder: 3/27 lap annabel DJP - still swol by me today.     Family History   Problem Relation Age of Onset   • Cancer Father         Prostate    • Diabetes Brother      Social History    Socioeconomic History      Marital status:       Spouse name: Not on file      Number of children: Not on myalgias. Skin: Negative for color change and rash. Neurological: Negative for tremors, syncope and weakness. Hematological: Negative for adenopathy. Does not bruise/bleed easily.    Psychiatric/Behavioral: Negative for behavioral problems and sleep d demonstrates chronic cholecystitis. Cholelithiasis was also present including sludge. No polyps, malignancy, or other abnormalities were noted. 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   The eliud

## 2019-05-30 PROBLEM — R79.89 ELEVATED LFTS: Status: RESOLVED | Noted: 2019-03-21 | Resolved: 2019-05-30

## 2019-05-30 PROBLEM — K80.65 CALCULUS OF GALLBLADDER AND BILE DUCT WITH CHRONIC CHOLECYSTITIS WITH OBSTRUCTION: Status: RESOLVED | Noted: 2019-03-26 | Resolved: 2019-05-30

## 2019-05-30 PROBLEM — R17 JAUNDICE: Status: RESOLVED | Noted: 2019-03-21 | Resolved: 2019-05-30

## 2019-05-30 PROBLEM — K82.8 PORCELAIN GALLBLADDER: Status: RESOLVED | Noted: 2019-03-26 | Resolved: 2019-05-30

## 2019-06-01 PROCEDURE — 81003 URINALYSIS AUTO W/O SCOPE: CPT | Performed by: FAMILY MEDICINE

## 2019-12-11 ENCOUNTER — APPOINTMENT (OUTPATIENT)
Dept: MRI IMAGING | Facility: HOSPITAL | Age: 84
End: 2019-12-11
Attending: EMERGENCY MEDICINE
Payer: MEDICARE

## 2019-12-11 ENCOUNTER — HOSPITAL ENCOUNTER (EMERGENCY)
Facility: HOSPITAL | Age: 84
Discharge: HOME | End: 2019-12-11
Attending: EMERGENCY MEDICINE
Payer: MEDICARE

## 2019-12-11 VITALS
HEART RATE: 69 BPM | HEIGHT: 72 IN | BODY MASS INDEX: 27.77 KG/M2 | TEMPERATURE: 98 F | DIASTOLIC BLOOD PRESSURE: 101 MMHG | SYSTOLIC BLOOD PRESSURE: 152 MMHG | WEIGHT: 205 LBS | RESPIRATION RATE: 14 BRPM | OXYGEN SATURATION: 92 %

## 2019-12-11 DIAGNOSIS — R20.2 PARESTHESIAS: Primary | ICD-10-CM

## 2019-12-11 PROCEDURE — 70553 MRI BRAIN STEM W/O & W/DYE: CPT | Performed by: EMERGENCY MEDICINE

## 2019-12-11 PROCEDURE — 85025 COMPLETE CBC W/AUTO DIFF WBC: CPT | Performed by: EMERGENCY MEDICINE

## 2019-12-11 PROCEDURE — 99284 EMERGENCY DEPT VISIT MOD MDM: CPT

## 2019-12-11 PROCEDURE — 83735 ASSAY OF MAGNESIUM: CPT | Performed by: EMERGENCY MEDICINE

## 2019-12-11 PROCEDURE — 80053 COMPREHEN METABOLIC PANEL: CPT | Performed by: EMERGENCY MEDICINE

## 2019-12-11 PROCEDURE — A9575 INJ GADOTERATE MEGLUMI 0.1ML: HCPCS

## 2019-12-11 PROCEDURE — 36415 COLL VENOUS BLD VENIPUNCTURE: CPT

## 2019-12-11 RX ORDER — MECLIZINE HYDROCHLORIDE 25 MG/1
25 TABLET ORAL 4 TIMES DAILY PRN
Qty: 20 TABLET | Refills: 0 | Status: SHIPPED | OUTPATIENT
Start: 2019-12-11 | End: 2020-09-17 | Stop reason: ALTCHOICE

## 2019-12-11 RX ORDER — MECLIZINE HYDROCHLORIDE 25 MG/1
25 TABLET ORAL 4 TIMES DAILY PRN
Qty: 20 TABLET | Refills: 0 | Status: SHIPPED | OUTPATIENT
Start: 2019-12-11 | End: 2019-12-11 | Stop reason: CLARIF

## 2019-12-11 NOTE — ED INITIAL ASSESSMENT (HPI)
Pt to ed with rpts of left face numbness that began about 2 wks ago. Slight discomfort Radiates to left shoulder and his neck. Says it feels like \"worms underneath skin. \" rpts this same sensation goes down his left leg.  Feels as though he is somewhat uns

## 2019-12-11 NOTE — ED PROVIDER NOTES
Patient Seen in: [facility] Emergency Department      History   Patient presents with:  Numbness Weakness    Stated Complaint: numbness/tingling to left side of face as well as left leg numbness.  dizziness *    HPI    Patient is an 59-year-old male left leg numbness. dizziness *  Other systems are as noted in HPI. Constitutional and vital signs reviewed. All other systems reviewed and negative except as noted above.     Physical Exam     ED Triage Vitals   BP 12/11/19 1015 (!) 148/100   Pulse 12 within normal limits   MAGNESIUM - Normal   CBC WITH DIFFERENTIAL WITH PLATELET    Narrative: The following orders were created for panel order CBC WITH DIFFERENTIAL WITH PLATELET.   Procedure                               Abnormality         Status will try meclizine at home. He should follow-up with neurology.         Disposition and Plan     Clinical Impression:  Paresthesias  (primary encounter diagnosis)    Disposition:  Discharge  12/11/2019  2:09 pm    Follow-up:  Dewey Cruz MD  [address] [place] [address]

## 2020-02-17 PROBLEM — M75.102 ROTATOR CUFF SYNDROME OF LEFT SHOULDER: Status: ACTIVE | Noted: 2020-02-17

## 2020-02-17 PROBLEM — S46.112S: Status: ACTIVE | Noted: 2020-02-17

## 2024-11-29 ENCOUNTER — HOSPITAL ENCOUNTER (EMERGENCY)
Facility: HOSPITAL | Age: 89
Discharge: HOME | End: 2024-11-29
Attending: EMERGENCY MEDICINE
Payer: MEDICARE

## 2024-11-29 ENCOUNTER — APPOINTMENT (OUTPATIENT)
Dept: MRI IMAGING | Facility: HOSPITAL | Age: 89
End: 2024-11-29
Attending: EMERGENCY MEDICINE
Payer: MEDICARE

## 2024-11-29 VITALS
HEIGHT: 71 IN | OXYGEN SATURATION: 100 % | BODY MASS INDEX: 28.7 KG/M2 | TEMPERATURE: 98 F | HEART RATE: 65 BPM | DIASTOLIC BLOOD PRESSURE: 80 MMHG | RESPIRATION RATE: 20 BRPM | SYSTOLIC BLOOD PRESSURE: 145 MMHG | WEIGHT: 205 LBS

## 2024-11-29 DIAGNOSIS — R20.2 FACIAL PARESTHESIA: ICD-10-CM

## 2024-11-29 DIAGNOSIS — R42 VERTIGO: Primary | ICD-10-CM

## 2024-11-29 LAB
ALBUMIN SERPL-MCNC: 4.2 G/DL (ref 3.2–4.8)
ALBUMIN/GLOB SERPL: 1.3 {RATIO} (ref 1–2)
ALP LIVER SERPL-CCNC: 57 U/L
ALT SERPL-CCNC: 30 U/L
ANION GAP SERPL CALC-SCNC: 8 MMOL/L (ref 0–18)
AST SERPL-CCNC: 20 U/L (ref ?–34)
ATRIAL RATE: 70 BPM
BASOPHILS # BLD AUTO: 0.02 X10(3) UL (ref 0–0.2)
BASOPHILS NFR BLD AUTO: 0.3 %
BILIRUB SERPL-MCNC: 0.7 MG/DL (ref 0.2–0.9)
BUN BLD-MCNC: 13 MG/DL (ref 9–23)
CALCIUM BLD-MCNC: 10.1 MG/DL (ref 8.7–10.4)
CHLORIDE SERPL-SCNC: 105 MMOL/L (ref 98–112)
CO2 SERPL-SCNC: 27 MMOL/L (ref 21–32)
CREAT BLD-MCNC: 0.88 MG/DL
EGFRCR SERPLBLD CKD-EPI 2021: 82 ML/MIN/1.73M2 (ref 60–?)
EOSINOPHIL # BLD AUTO: 0.08 X10(3) UL (ref 0–0.7)
EOSINOPHIL NFR BLD AUTO: 1.4 %
ERYTHROCYTE [DISTWIDTH] IN BLOOD BY AUTOMATED COUNT: 12.9 %
GLOBULIN PLAS-MCNC: 3.2 G/DL (ref 2–3.5)
GLUCOSE BLD-MCNC: 106 MG/DL (ref 70–99)
HCT VFR BLD AUTO: 44.7 %
HGB BLD-MCNC: 15.7 G/DL
IMM GRANULOCYTES # BLD AUTO: 0.01 X10(3) UL (ref 0–1)
IMM GRANULOCYTES NFR BLD: 0.2 %
LYMPHOCYTES # BLD AUTO: 0.9 X10(3) UL (ref 1–4)
LYMPHOCYTES NFR BLD AUTO: 15.7 %
MCH RBC QN AUTO: 32.4 PG (ref 26–34)
MCHC RBC AUTO-ENTMCNC: 35.1 G/DL (ref 31–37)
MCV RBC AUTO: 92.2 FL
MONOCYTES # BLD AUTO: 0.53 X10(3) UL (ref 0.1–1)
MONOCYTES NFR BLD AUTO: 9.2 %
NEUTROPHILS # BLD AUTO: 4.19 X10 (3) UL (ref 1.5–7.7)
NEUTROPHILS # BLD AUTO: 4.19 X10(3) UL (ref 1.5–7.7)
NEUTROPHILS NFR BLD AUTO: 73.2 %
OSMOLALITY SERPL CALC.SUM OF ELEC: 291 MOSM/KG (ref 275–295)
P AXIS: 46 DEGREES
P-R INTERVAL: 148 MS
PLATELET # BLD AUTO: 175 10(3)UL (ref 150–450)
POTASSIUM SERPL-SCNC: 4 MMOL/L (ref 3.5–5.1)
PROT SERPL-MCNC: 7.4 G/DL (ref 5.7–8.2)
Q-T INTERVAL: 410 MS
QRS DURATION: 82 MS
QTC CALCULATION (BEZET): 442 MS
R AXIS: 7 DEGREES
RBC # BLD AUTO: 4.85 X10(6)UL
SODIUM SERPL-SCNC: 140 MMOL/L (ref 136–145)
T AXIS: 21 DEGREES
VENTRICULAR RATE: 70 BPM
WBC # BLD AUTO: 5.7 X10(3) UL (ref 4–11)

## 2024-11-29 PROCEDURE — 36415 COLL VENOUS BLD VENIPUNCTURE: CPT

## 2024-11-29 PROCEDURE — 85025 COMPLETE CBC W/AUTO DIFF WBC: CPT

## 2024-11-29 PROCEDURE — 93010 ELECTROCARDIOGRAM REPORT: CPT

## 2024-11-29 PROCEDURE — A9575 INJ GADOTERATE MEGLUMI 0.1ML: HCPCS | Performed by: EMERGENCY MEDICINE

## 2024-11-29 PROCEDURE — 70553 MRI BRAIN STEM W/O & W/DYE: CPT | Performed by: EMERGENCY MEDICINE

## 2024-11-29 PROCEDURE — 70549 MR ANGIOGRAPH NECK W/O&W/DYE: CPT | Performed by: EMERGENCY MEDICINE

## 2024-11-29 PROCEDURE — 93005 ELECTROCARDIOGRAM TRACING: CPT

## 2024-11-29 PROCEDURE — 80053 COMPREHEN METABOLIC PANEL: CPT | Performed by: EMERGENCY MEDICINE

## 2024-11-29 PROCEDURE — 70546 MR ANGIOGRAPH HEAD W/O&W/DYE: CPT | Performed by: EMERGENCY MEDICINE

## 2024-11-29 PROCEDURE — 85025 COMPLETE CBC W/AUTO DIFF WBC: CPT | Performed by: EMERGENCY MEDICINE

## 2024-11-29 PROCEDURE — 99285 EMERGENCY DEPT VISIT HI MDM: CPT

## 2024-11-29 PROCEDURE — 80053 COMPREHEN METABOLIC PANEL: CPT

## 2024-11-29 RX ORDER — GADOTERATE MEGLUMINE 376.9 MG/ML
20 INJECTION INTRAVENOUS
Status: COMPLETED | OUTPATIENT
Start: 2024-11-29 | End: 2024-11-29

## 2024-11-29 RX ORDER — MECLIZINE HYDROCHLORIDE 25 MG/1
25 TABLET ORAL ONCE
Status: COMPLETED | OUTPATIENT
Start: 2024-11-29 | End: 2024-11-29

## 2024-11-29 RX ORDER — MECLIZINE HYDROCHLORIDE 25 MG/1
25 TABLET ORAL 3 TIMES DAILY PRN
Qty: 20 TABLET | Refills: 0 | Status: SHIPPED | OUTPATIENT
Start: 2024-11-29

## 2024-11-29 NOTE — ED INITIAL ASSESSMENT (HPI)
Patient A&Ox4 here with daughter for dizziness and pressure on the left cheek. Symptoms have been going on for about 1.5 weeks. Patient denies any vision changes. Endorses needing to think about controlling his walk due to the dizziness. Hx of vertigo but never put on medications for it.

## 2024-11-29 NOTE — ED PROVIDER NOTES
Patient Seen in: [facility] Emergency Department      History     Chief Complaint   Patient presents with    Dizziness    Pain     Stated Complaint: dizzy and pressure on left cheek. denies ha.denies facial droop    Subjective:   HPI      89-year-old male with a past medical history as below including GERD, hyperlipidemia, PAD presents with dizziness and left cheek numbness.  Patient states he had a root canal on his left lower molar at the beginning of the month and had a crown placed about a week later.  He states dizziness started about a week after that and describes it as feeling off balance like his equilibrium is off when he is walking.  He states symptoms are worse when he lays down or if he turns his head to the left.  His daughter states that he had a similar episode 5 or 6 years ago and saw specialist that said it was related to crystals in his neck and you was given exercises to do with members of his head to help improve the symptoms.  Patient states his left cheek feels numb but denies pain or swelling.  He denies any numbness or weakness of his extremities.  Denies headache or visual changes.  Denies chest pain, palpitations or shortness of breath.  Denies feeling lightheaded or faint.    Objective:     Past Medical History:    Esophageal reflux    Other and unspecified hyperlipidemia    Visual impairment    READERS              Past Surgical History:   Procedure Laterality Date    Arthroscopy of joint unlisted Right     SHOULDER    Arthrotomy,open repair meniscus Right     SHOULDER    Cataract Bilateral     Laparoscopic cholecystectomy      Other surgical history  2006    Parathyroidectomy    Other surgical history  1990    Repair R torn biceps tendon    Parathyroidectomy      Removal gallbladder  2018    Upper gi endoscopy,exam                  Social History     Socioeconomic History    Marital status:    Tobacco Use    Smoking status: Never    Smokeless tobacco: Never   Vaping Use     Vaping status: Never Used   Substance and Sexual Activity    Alcohol use: No    Drug use: No                  Physical Exam     ED Triage Vitals [11/29/24 1143]   BP (!) 180/82   Pulse 78   Resp 16   Temp 97.9 °F (36.6 °C)   Temp src Oral   SpO2 96 %   O2 Device None (Room air)       Current Vitals:   Vital Signs  BP: 154/74  Pulse: 70  Resp: 18  Temp: 97.9 °F (36.6 °C)  Temp src: Oral  MAP (mmHg): 97    Oxygen Therapy  SpO2: 100 %  O2 Device: None (Room air)        Physical Exam  Vitals and nursing note reviewed.   Constitutional:       General: He is not in acute distress.     Appearance: He is well-developed. He is not ill-appearing.   HENT:      Head: Normocephalic and atraumatic.      Mouth/Throat:      Mouth: Mucous membranes are moist.   Eyes:      General: No scleral icterus.     Extraocular Movements: Extraocular movements intact.   Cardiovascular:      Rate and Rhythm: Normal rate and regular rhythm.   Pulmonary:      Effort: Pulmonary effort is normal.      Breath sounds: Normal breath sounds.   Musculoskeletal:      Cervical back: Neck supple.   Skin:     General: Skin is warm and dry.      Capillary Refill: Capillary refill takes less than 2 seconds.   Neurological:      General: No focal deficit present.      Mental Status: He is alert and oriented to person, place, and time.      GCS: GCS eye subscore is 4. GCS verbal subscore is 5. GCS motor subscore is 6.      Cranial Nerves: No cranial nerve deficit.      Sensory: No sensory deficit.      Motor: No weakness.      Coordination: Coordination normal.   Psychiatric:         Mood and Affect: Mood normal.         Behavior: Behavior normal.             ED Course     Labs Reviewed   CBC WITH DIFFERENTIAL WITH PLATELET - Abnormal; Notable for the following components:       Result Value    Lymphocyte Absolute 0.90 (*)     All other components within normal limits   COMP METABOLIC PANEL (14) - Abnormal; Notable for the following components:    Glucose 106  (*)     All other components within normal limits   RAINBOW DRAW LAVENDER   RAINBOW DRAW LIGHT GREEN   RAINBOW DRAW BLUE     EKG    Rate, intervals and axes as noted on EKG Report.  Rate: 70  Rhythm: Sinus Rhythm  Reading: Normal sinus rhythm, no ST/T wave changes                MRI BRAIN MRA BRAIN+MRA NECK (ALL W+WO) (CPT=70553/49305/58655)    Result Date: 11/29/2024  CONCLUSION:  1. No acute infarct, acute intracranial hemorrhage, or hydrocephalus. 2. Trace chronic small vessel ischemic disease. 3. No high-grade stenosis, occlusion, aneurysm, or dissection is identified within the major intracranial or cervical arterial vasculature.   LOCATION:  [id number]   Dictated by (CST): Stromberg, LeRoy, MD on 11/29/2024 at 6:02 PM     Finalized by (CST): Stromberg, LeRoy, MD on 11/29/2024 at 6:18 PM             MDM      89-year-old male with a past medical history as below including GERD, hyperlipidemia, PAD presents with dizziness and left cheek numbness.      Differential includes but is not limited to vertigo, vestibular neuritis, left cheek paresthesias, unlikely CVA    Labs are unremarkable with normal WBC, hemoglobin, electrolytes and renal function.    Independent trepidation of MRI/MRI brain shows no evidence of stroke.  Radiology report reviewed as above noting no acute infarct or high-grade stenosis.  There is some trace chronic small vessel ischemic changes which is expected at his age.    Patient's symptoms are consistent with benign positional vertigo which patient states, reports having similar symptoms which improved with head exercise use he was given likely representing Epley maneuvers.  Will give Rx for meclizine.  Instructed to follow-up with PCP and ENT for further outpatient management.  Return precautions discussed.    Medical Decision Making  Amount and/or Complexity of Data Reviewed  Independent Historian: caregiver     Details: Daughter, see HPI  Labs: ordered. Decision-making details documented in ED  Course.  Radiology: ordered and independent interpretation performed. Decision-making details documented in ED Course.  ECG/medicine tests: ordered and independent interpretation performed. Decision-making details documented in ED Course.    Risk  Prescription drug management.        Disposition and Plan     Clinical Impression:  1. Vertigo    2. Facial paresthesia         Disposition:  Discharge  11/29/2024  6:41 pm    Follow-up:  Erick Augustin MD  [address] [facility] [address]  [address]  [facility] [id number] [phone]    Schedule an appointment as soon as possible for a visit in 2 day(s)      Zander Estrada MD  [address]  [address]  [facility] [id number] [phone]    Schedule an appointment as soon as possible for a visit            Medications Prescribed:  Current Discharge Medication List        START taking these medications    Details   meclizine 25 MG Oral Tab Take 1 tablet (25 mg total) by mouth 3 (three) times daily as needed for Dizziness.  Qty: 20 tablet, Refills: 0                 Supplementary Documentation:

## 2024-11-30 NOTE — ED QUICK NOTES
Pt returned from MRI. Updated on plan of care. Daughter at bedside. Pt sitting comfortably on stretcher, side rails up, stretcher down, call light within reach.

## (undated) DEVICE — SYRINGE 60ML SLIP TIP

## (undated) DEVICE — LOCKING DEVICE RX & BIOPSY CAP

## (undated) DEVICE — KENDALL SCD EXPRESS SLEEVES, KNEE LENGTH, MEDIUM: Brand: KENDALL SCD

## (undated) DEVICE — SOL  .9 1000ML BTL

## (undated) DEVICE — LAP CHOLE/APPY CDS-LF: Brand: MEDLINE INDUSTRIES, INC.

## (undated) DEVICE — Device

## (undated) DEVICE — DISPOSABLE LAPAROSCOPIC CLIP APPLIER WITH 20 CLIPS.: Brand: EPIX® UNIVERSAL CLIP APPLIER

## (undated) DEVICE — 3M™ RED DOT™ MONITORING ELECTRODE WITH FOAM TAPE AND STICKY GEL, 50/BAG, 20/CASE, 72/PLT 2570: Brand: RED DOT™

## (undated) DEVICE — SUTURE VICRYL 5-0 PC-1

## (undated) DEVICE — TROCAR: Brand: KII SHIELDED BLADED ACCESS SYSTEM

## (undated) DEVICE — TIGERTAIL 5F FLXTIP 70CM

## (undated) DEVICE — 1200CC GUARDIAN II: Brand: GUARDIAN

## (undated) DEVICE — FILTERLINE NASAL ADULT O2/CO2

## (undated) DEVICE — ENDOPATH ULTRA VERESS INSUFFLATION NEEDLES WITH LUER LOCK CONNECTORS: Brand: ENDOPATH

## (undated) DEVICE — NITINOL WIRE STR 035

## (undated) DEVICE — PDS II VLT 0 107CM AG ST3: Brand: ENDOLOOP

## (undated) DEVICE — STERILE POLYISOPRENE POWDER-FREE SURGICAL GLOVES: Brand: PROTEXIS

## (undated) DEVICE — CHLORAPREP 26ML APPLICATOR

## (undated) DEVICE — MEDI-VAC SUCTION HANDLE REGULAR CAPACITY: Brand: CARDINAL HEALTH

## (undated) DEVICE — TROCAR: Brand: KII SLEEVE

## (undated) DEVICE — MONOFILAMENT ABSORBABLE SUTURE: Brand: MAXON

## (undated) DEVICE — MEDI-VAC NON-CONDUCTIVE SUCTION TUBING: Brand: CARDINAL HEALTH

## (undated) DEVICE — ENDOSCOPY PACK UPPER: Brand: MEDLINE INDUSTRIES, INC.

## (undated) DEVICE — DRAPE C-ARM UNIVERSAL

## (undated) NOTE — LETTER
Funmi Dunn [address] [address]  [place], [address] [facility]    Consent for Operation  Date: __________________                                Time: _______________    1.  I authorize the performance upon Jocelin Christianson the following operation:  Procedure( procedure has been videotaped, the surgeon will obtain the original videotape. The hospital will not be responsible for storage or maintenance of this tape.   7. For the purpose of advancing medical education, I consent to the admittance of observers to the STATEMENTS REQUIRING INSERTION OR COMPLETION WERE FILLED IN.     Signature of Patient:   ___________________________    When the patient is a minor or mentally incompetent to give consent:  Signature of person authorized to consent for patient: ____________ supplements, and pills I can buy without a prescription (including street drugs/illegal medications). Failure to inform my anesthesiologist about these medicines may increase my risk of anesthetic complications. iv.  If I am allergic to anything or have ha Anesthesiologist Signature     Date   Time  I have discussed the procedure and information above with the patient (or patient’s representative) and answered their questions. The patient or their representative has agreed to have anesthesia services.     ___

## (undated) NOTE — ED AVS SNAPSHOT
Keyla Cruz   MRN: [id number]    Department:  [facility] Emergency Department   Date of Visit:  12/11/2019           Disclosure     Insurance plans vary and the physician(s) referred by the ER may not be covered by your plan.  Please contact you tell this physician (or your personal doctor if your instructions are to return to your personal doctor) about any new or lasting problems. The primary care or specialist physician will see patients referred from the [facility] Emergency Department.  Charly Serna

## (undated) NOTE — LETTER
19    Patient: Jovita López  : 4/3/1934 Visit date: 3/25/2019    Dear  Dr. Hali Talbert MD,    Thank you for referring Camdenjuany López to my practice. Please find my assessment and plan below.                 Assessment   Calculus of gallbladder patient has not had a stroke or TIA. The patient does not have a heart murmur or valve. The patient does not require special antibiotics prior to surgery because of a prosthesis or any other reason.      The patient is not on medications that might interfer

## (undated) NOTE — LETTER
[facility] [address], [address] [place] [address]    Consent for Anesthesia   1.    Bisi Jules agree to be cared for by an anesthesiologist, who is specially trained to monitor me and give me medicine to put me to sleep or keep me comfort vision, nerves, or muscles and in extremely rare instances death. 5. My doctor has explained to me other choices available to me for my care (alternatives).   6. Pregnant Patients (“epidural”):  I understand that the risks of having an epidural (medicine g

## (undated) NOTE — LETTER
19    Patient: Karsten Bettencourt  : 4/3/1934 Visit date: 2019    Dear  Dr. Wendy Gitelman, MD,    Thank you for referring Karsten Bettencourt to my practice. Please find my assessment and plan below. The patient is recovering well following surgery.

## (undated) NOTE — LETTER
[facility]  Phone: [phone]  Right Fax: [phone]  \[facility]\"" 20 By:  Vnii Turk RN  Date: 3/25/19    Patient Name: Anabelle Kiya  Surgery Date: 3/27/2019    CSN: [id number]  Medical Record: [id number]